# Patient Record
Sex: FEMALE | Race: WHITE | Employment: OTHER | URBAN - METROPOLITAN AREA
[De-identification: names, ages, dates, MRNs, and addresses within clinical notes are randomized per-mention and may not be internally consistent; named-entity substitution may affect disease eponyms.]

---

## 2022-04-06 ENCOUNTER — HOSPITAL ENCOUNTER (OUTPATIENT)
Age: 67
Discharge: HOME | End: 2022-04-06
Payer: MEDICARE

## 2022-04-06 ENCOUNTER — TELEPHONE (OUTPATIENT)
Dept: OPHTHALMOLOGY | Facility: CLINIC | Age: 67
End: 2022-04-06

## 2022-04-06 VITALS
DIASTOLIC BLOOD PRESSURE: 86 MMHG | RESPIRATION RATE: 18 BRPM | SYSTOLIC BLOOD PRESSURE: 145 MMHG | OXYGEN SATURATION: 98 % | HEART RATE: 88 BPM | TEMPERATURE: 99 F

## 2022-04-06 DIAGNOSIS — S05.02XA ABRASION OF LEFT CORNEA, INITIAL ENCOUNTER: Primary | ICD-10-CM

## 2022-04-06 PROCEDURE — 99203 OFFICE O/P NEW LOW 30 MIN: CPT | Performed by: NURSE PRACTITIONER

## 2022-04-06 RX ORDER — ERYTHROMYCIN 5 MG/G
1 OINTMENT OPHTHALMIC EVERY 6 HOURS
Qty: 1 G | Refills: 0 | Status: SHIPPED | OUTPATIENT
Start: 2022-04-06 | End: 2022-04-13

## 2022-04-06 NOTE — TELEPHONE ENCOUNTER
Pt's  called. Pt went to immediate care today 4-6-22 abrasion of left cornea. Referred to be seen within 24 to 48 hours.    Call pt

## 2022-04-06 NOTE — ED INITIAL ASSESSMENT (HPI)
Pt states having left eye pain that began yesterday after applying make up. Pt states woke up today with with redness discharge and blurry vision in left eye. Pt states painful and change in vision. Pt states just came back from [facility] 2 days ago.

## 2022-04-07 ENCOUNTER — OFFICE VISIT (OUTPATIENT)
Dept: OPHTHALMOLOGY | Facility: CLINIC | Age: 67
End: 2022-04-07
Payer: MEDICARE

## 2022-04-07 DIAGNOSIS — H18.892 CORNEAL DEFECT, LEFT: Primary | ICD-10-CM

## 2022-04-07 PROCEDURE — 99203 OFFICE O/P NEW LOW 30 MIN: CPT | Performed by: OPHTHALMOLOGY

## 2022-04-07 RX ORDER — PREDNISOLONE ACETATE 10 MG/ML
SUSPENSION/ DROPS OPHTHALMIC
Qty: 1 EACH | Refills: 0 | Status: SHIPPED | OUTPATIENT
Start: 2022-04-07

## 2022-04-07 NOTE — ASSESSMENT & PLAN NOTE
START Pred forte drops- use 1 drop 4 times a day in the left eye. Continue taking Pred forte 4 times a day until next visit next week. Use Erythromycin ointment at bedtime in the left eye. (okay to use during the day, but advise only to use at bedtime. Return next week, but advised patient to call on this Saturday morning for an appointment if symptoms are worsening.

## 2022-04-07 NOTE — PATIENT INSTRUCTIONS
Corneal defect, left  START Pred forte drops- use 1 drop 4 times a day in the left eye. Continue taking Pred forte 4 times a day until next visit next week. Use Erythromycin ointment at bedtime in the left eye. (okay to use during the day, but advise only to use at bedtime. Return next week, but advised patient to call on this Saturday morning for an appointment if symptoms are worsening.

## 2022-04-12 ENCOUNTER — TELEPHONE (OUTPATIENT)
Dept: OPHTHALMOLOGY | Facility: CLINIC | Age: 67
End: 2022-04-12

## 2022-04-12 NOTE — TELEPHONE ENCOUNTER
Spoke to patient regarding canceled appointment for today to recheck K defect left eye. Pt states she canceled because left eye felt much better and she saw something small come out of her eye which she thinks is what was causing irritation. Pt rescheduled to Thursday 4/14/22 @ 3:30pm. Advised patient it is best to come back to follow up to make sure eye is completely healed and due to being on a steroid eye drop for a few days. Pt understands.

## 2022-04-14 ENCOUNTER — OFFICE VISIT (OUTPATIENT)
Dept: OPHTHALMOLOGY | Facility: CLINIC | Age: 67
End: 2022-04-14
Payer: MEDICARE

## 2022-04-14 DIAGNOSIS — H18.892 CORNEAL DEFECT, LEFT: Primary | ICD-10-CM

## 2022-04-14 DIAGNOSIS — H53.10 EYE STRAIN, BILATERAL: ICD-10-CM

## 2022-04-14 PROCEDURE — 99213 OFFICE O/P EST LOW 20 MIN: CPT | Performed by: OPHTHALMOLOGY

## 2022-04-14 NOTE — ASSESSMENT & PLAN NOTE
Recommend that patient take an \"eye break\" when they are working at the computer. Patient should take a break every 20 minutes, looking away from the computer for 20 seconds at the distance of 20 feet to prevent eye fatigue.

## 2022-04-14 NOTE — ASSESSMENT & PLAN NOTE
Resolved. Stay off of Prednisolone drops. No further treatment. Patient can use artificial tears (any over the counter brand is okay) up to 4 times per day as needed for eye irritation. Patient should call office and make return appointment if symptoms worsen or do not completely resolve.

## 2022-04-14 NOTE — PATIENT INSTRUCTIONS
Corneal defect, left  Resolved. Stay off of Prednisolone drops. No further treatment. Patient can use artificial tears (any over the counter brand is okay) up to 4 times per day as needed for eye irritation. Patient should call office and make return appointment if symptoms worsen or do not completely resolve. Eye strain, bilateral  Recommend that patient take an \"eye break\" when they are working at the computer. Patient should take a break every 20 minutes, looking away from the computer for 20 seconds at the distance of 20 feet to prevent eye fatigue.

## 2022-10-22 ENCOUNTER — HOSPITAL ENCOUNTER (OUTPATIENT)
Age: 67
Discharge: HOME | End: 2022-10-22
Payer: COMMERCIAL

## 2022-10-22 VITALS
DIASTOLIC BLOOD PRESSURE: 85 MMHG | TEMPERATURE: 99 F | RESPIRATION RATE: 20 BRPM | HEART RATE: 103 BPM | SYSTOLIC BLOOD PRESSURE: 132 MMHG | OXYGEN SATURATION: 98 %

## 2022-10-22 DIAGNOSIS — N61.0 CELLULITIS OF RIGHT BREAST: Primary | ICD-10-CM

## 2022-10-22 DIAGNOSIS — N63.12 MASS OF UPPER INNER QUADRANT OF RIGHT BREAST: ICD-10-CM

## 2022-10-22 RX ORDER — CEPHALEXIN 500 MG/1
500 CAPSULE ORAL 3 TIMES DAILY
Qty: 21 CAPSULE | Refills: 0 | Status: SHIPPED | OUTPATIENT
Start: 2022-10-22 | End: 2022-10-29

## 2022-10-24 NOTE — ED QUICK NOTES
Pt called stating that there is more pain and a foul odor from abscess even after being on antibiotics for 3 days. Instructed pt to go to ED.

## 2022-10-31 ENCOUNTER — OFFICE VISIT (OUTPATIENT)
Dept: SURGERY | Facility: CLINIC | Age: 67
End: 2022-10-31
Payer: COMMERCIAL

## 2022-10-31 VITALS
DIASTOLIC BLOOD PRESSURE: 89 MMHG | HEIGHT: 67 IN | SYSTOLIC BLOOD PRESSURE: 142 MMHG | HEART RATE: 92 BPM | BODY MASS INDEX: 28.28 KG/M2 | WEIGHT: 180.19 LBS | OXYGEN SATURATION: 97 % | RESPIRATION RATE: 16 BRPM | TEMPERATURE: 98 F

## 2022-10-31 DIAGNOSIS — N60.01 BREAST CYST, RIGHT: Primary | ICD-10-CM

## 2022-10-31 PROCEDURE — 3077F SYST BP >= 140 MM HG: CPT | Performed by: SURGERY

## 2022-10-31 PROCEDURE — 87070 CULTURE OTHR SPECIMN AEROBIC: CPT | Performed by: SURGERY

## 2022-10-31 PROCEDURE — 10060 I&D ABSCESS SIMPLE/SINGLE: CPT | Performed by: SURGERY

## 2022-10-31 PROCEDURE — 3008F BODY MASS INDEX DOCD: CPT | Performed by: SURGERY

## 2022-10-31 PROCEDURE — 87205 SMEAR GRAM STAIN: CPT | Performed by: SURGERY

## 2022-10-31 PROCEDURE — 99244 OFF/OP CNSLTJ NEW/EST MOD 40: CPT | Performed by: SURGERY

## 2022-10-31 PROCEDURE — 3079F DIAST BP 80-89 MM HG: CPT | Performed by: SURGERY

## 2022-10-31 RX ORDER — AMOXICILLIN AND CLAVULANATE POTASSIUM 875; 125 MG/1; MG/1
1 TABLET, FILM COATED ORAL EVERY 12 HOURS
COMMUNITY
Start: 2022-10-25

## 2022-11-01 ENCOUNTER — TELEPHONE (OUTPATIENT)
Dept: SURGERY | Facility: CLINIC | Age: 67
End: 2022-11-01

## 2022-11-01 NOTE — TELEPHONE ENCOUNTER
Called patient to check how she is feeling after aspiration in office. Per patient she still has 1 more day left of the amoxicillin remaining. Patient overall feeling well, had some drainage yesterday and no drainage today. Informed patient that we will continue to follow up.

## 2022-11-01 NOTE — TELEPHONE ENCOUNTER
Called patient to see how she was doing and how much longer she has oral antibiotics. No answer, left voicemail to call back to office.

## 2024-05-08 ENCOUNTER — OFFICE VISIT (OUTPATIENT)
Facility: CLINIC | Age: 69
End: 2024-05-08
Payer: MEDICARE

## 2024-05-08 ENCOUNTER — TELEPHONE (OUTPATIENT)
Facility: CLINIC | Age: 69
End: 2024-05-08

## 2024-05-08 VITALS
SYSTOLIC BLOOD PRESSURE: 142 MMHG | HEART RATE: 99 BPM | BODY MASS INDEX: 29.24 KG/M2 | DIASTOLIC BLOOD PRESSURE: 89 MMHG | HEIGHT: 67 IN | WEIGHT: 186.31 LBS

## 2024-05-08 DIAGNOSIS — Z12.11 COLON CANCER SCREENING: Primary | ICD-10-CM

## 2024-05-08 DIAGNOSIS — R10.9 ABDOMINAL PAIN, UNSPECIFIED ABDOMINAL LOCATION: ICD-10-CM

## 2024-05-08 DIAGNOSIS — R10.9 ABDOMINAL PAIN, UNSPECIFIED ABDOMINAL LOCATION: Primary | ICD-10-CM

## 2024-05-08 PROCEDURE — 99204 OFFICE O/P NEW MOD 45 MIN: CPT | Performed by: INTERNAL MEDICINE

## 2024-05-08 NOTE — PROGRESS NOTES
Claudia Trotter is a 68 year old female.    HPI:     Chief Complaint   Patient presents with    Jaw Pain     Slight pain on rt flank side       The patient is a 68-year-old female with history of hemorrhoids and constipation who presents for evaluation of right flank pain and colon cancer screening.    Claudia reports she has developed intermittent episodes of right upper quadrant/right flank pain which she feels like radiates up to her right jaw.  She has noticed this after heavier meals.  She did test out her GI tract by eating a hot dog yesterday and developed symptoms of discomfort in the right upper quadrant/right flank with radiation into the right jaw.  She had concerns about her gallbladder as there is a family history of sludge.  She does have chronic digestive issues including constipation particularly with dehydration.  She does workout quite a bit with Cross[employer] style exercises.  If she does not drink enough water she does develop harder stools.  She also has noticed in the winter and or when it is cold around she has digestive problems as well.     Last colonoscopy was at [facility] 12 years ago, no prior EGD.     She does have 4 grandchildren has noted episodes of gastroenteritis like symptoms.  She has about 4 episodes in the last year or so 1 of these was related to COVID.  She does get intermittent episodes of vomiting and anorexia.    There is a family history of cancer, she had a sister with breast cancer.  She reports that BRCA gene was in the family but she herself has not been tested.    Patient is a former , she does sing as well for living.  She denies any vocal issues, no reflux, no dysphagia.  Weights been stable.    She is seeing a new physician tomorrow for primary care.      HISTORY:  Past Medical History:    Hemorrhoids      Past Surgical History:   Procedure Laterality Date    Colonoscopy      12 yrs ago at [facility]    Lasik Bilateral 2001    [facility]    Tonsillectomy       Tubal ligation        Family History   Problem Relation Age of Onset    Other (gleoblastoma) Father     Pancreatic Cancer Mother     Cancer Sister         breast cancer    Diabetes Brother     Glaucoma Neg     Macular degeneration Neg       Social History:   Social History     Socioeconomic History    Marital status:    Tobacco Use    Smoking status: Never     Passive exposure: Past    Smokeless tobacco: Never   Vaping Use    Vaping status: Never Used   Substance and Sexual Activity    Alcohol use: Yes     Comment: a quarter of a beer    Drug use: Never     Social Determinants of Health      Received from [facility]    Social Connections    Received from [facility]    Housing Stability        Medications (Active prior to today's visit):  Current Outpatient Medications   Medication Sig Dispense Refill    amoxicillin clavulanate 875-125 MG Oral Tab Take 1 tablet by mouth Q12H. FOR 10 DAYS (Patient not taking: Reported on 5/8/2024)         Allergies:  Allergies   Allergen Reactions    Morphine OTHER (SEE COMMENTS)     intolerance    Other OTHER (SEE COMMENTS)     Narcotics cause nightmares     Sulfa Antibiotics OTHER (SEE COMMENTS)    Corn-Containing Products [Corn-Related Products] OTHER (SEE COMMENTS)         ROS:   The patient denies any chest pain or shortness of breath,  No neurologic or dermatologic symptoms.     PHYSICAL EXAM:   Blood pressure 142/89, pulse 99, height 5' 7\" (1.702 m), weight 186 lb 4.8 oz (84.5 kg).    The patient appears their stated age and is in no acute distress  HEENT- anicteric sclera, neck no lymphadnopathy, OP- clear with no masses or lesions  Chest- Clear bilaterally, no wheezing,  Heart- regular rate, no murmur or gallop  Abdomen- Soft and nontender, nondistended  Ext- no clubbing or cyanosis  Skin- no rashes or lesions  Neuro- appropriate response, alert, no confusion  .  ASSESSMENT/PLAN:   Assessment     Claudia has been  experiencing intermittent episodes of right upper quadrant pain, her symptoms of this in the jaw sound more like TMJ as she does notice a clicking when moving her jaw.  She does have a family history of gallbladder.  Symptoms are typically triggered by fatty meals.    Discussed dietary changes, consideration for upper endoscopy for atypical reflux type symptoms, screening for celiac disease with blood test as well as plan to set up right upper quadrant ultrasound.  Patient will call to schedule this.    The patient is due for colon screening, discussed her options and patient is agreeable to colonoscopy.  Will set this up with the upper endoscopy under MAC sedation.  Risks and benefits were discussed and she is agreeable to proceeding.    Plan  Abdominal issues /pain  Colon cancer screening  - colonoscopy and EGD with MAC and Golytely   - lab work - please have your primary doctor draw a celiac blood test  - call to set up ultrasound     EGD/Colonoscopy consent: I have discussed the risks, benefits, and alternatives to EGD/colonoscopy with the patient [who demonstrated understanding], including but not limited to the risks of bleeding, infection, pain, death, as well as the risks of anesthesia and perforation all leading to prolonged hospitalization, surgical intervention, or even death. I also specifically mentioned the miss rate of colonoscopy of 5-10% in the best of all circumstances. All questions were answered to the patient's satisfaction. The patient signed informed consent and elected to proceed with colonoscopy with intervention [i.e. polypectomy, stent placement, etc.] as indicated.       Orders This Visit:  No orders of the defined types were placed in this encounter.      Meds This Visit:  Requested Prescriptions      No prescriptions requested or ordered in this encounter       Imaging & Referrals:  None       Theo Wagner MD  [facility]

## 2024-05-08 NOTE — PATIENT INSTRUCTIONS
Abdominal issues /pain  Colon cancer screening  - colonoscopy and EGD with MAC and Golytely   - lab work - please have your primary doctor draw a celiac blood test  - call to set up ultrasound

## 2024-05-08 NOTE — TELEPHONE ENCOUNTER
Scheduled for:  Colonoscopy 34139/EGD [id number]  Provider Name:   Wagner  Date:  08/26/2024  Location:  [facility]  Sedation:  MAC  Time:  1030 (pt is aware to arrive at 0930)  Prep:  Colyte  Meds/Allergies Reconciled?:  Physician reviewed  Diagnosis with codes:  CCS Z12.11; Abdominal Pain R10.9  Was patient informed to call insurance with codes (Y/N):       Referral sent?:  Referral was sent at the time of electronic surgical scheduling.    EM or [facility] notified?:  I sent an electronic request to Endo Scheduling and received a confirmation today.   Medication Orders:  Patient is aware to NOT take iron pills, herbal meds and diet supplements for 7 days before exam. Also to NOT take any form of alcohol, recreational drugs and any forms of ED meds 24-72 hours before exam.     Misc Orders:       Further instructions given by staff:  I discussed the prep intructions with the patient in office which SHE verbally understood. Copy of instructions was handed to patient as well. Patient was also advised about cancellation policy.

## 2024-06-11 ENCOUNTER — TELEPHONE (OUTPATIENT)
Facility: CLINIC | Age: 69
End: 2024-06-11

## 2024-06-11 NOTE — TELEPHONE ENCOUNTER
1st Reminder letter was sent to patient mychart for orders pending:     Immunoglobulin A, Qn, Serum (IGA) (Order #[id number]) on 5/8/24     Tissue Transglutaminase Ab, IgA (Order #[id number]) on 5/8/24     US ABDOMEN COMPLETE (CPT=76700) (Order #[id number]) on 5/8/24

## 2024-08-26 ENCOUNTER — ANESTHESIA (OUTPATIENT)
Dept: ENDOSCOPY | Facility: HOSPITAL | Age: 69
End: 2024-08-26
Payer: MEDICARE

## 2024-08-26 ENCOUNTER — TELEPHONE (OUTPATIENT)
Facility: CLINIC | Age: 69
End: 2024-08-26

## 2024-08-26 ENCOUNTER — HOSPITAL ENCOUNTER (OUTPATIENT)
Facility: HOSPITAL | Age: 69
Setting detail: HOSPITAL OUTPATIENT SURGERY
Discharge: HOME | End: 2024-08-26
Attending: INTERNAL MEDICINE | Admitting: INTERNAL MEDICINE
Payer: MEDICARE

## 2024-08-26 ENCOUNTER — ANESTHESIA EVENT (OUTPATIENT)
Dept: ENDOSCOPY | Facility: HOSPITAL | Age: 69
End: 2024-08-26
Payer: MEDICARE

## 2024-08-26 DIAGNOSIS — Z12.11 COLON CANCER SCREENING: ICD-10-CM

## 2024-08-26 DIAGNOSIS — R10.9 ABDOMINAL PAIN, UNSPECIFIED ABDOMINAL LOCATION: ICD-10-CM

## 2024-08-26 PROCEDURE — 45385 COLONOSCOPY W/LESION REMOVAL: CPT | Performed by: INTERNAL MEDICINE

## 2024-08-26 PROCEDURE — 45380 COLONOSCOPY AND BIOPSY: CPT | Performed by: INTERNAL MEDICINE

## 2024-08-26 PROCEDURE — 0DB98ZX EXCISION OF DUODENUM, VIA NATURAL OR ARTIFICIAL OPENING ENDOSCOPIC, DIAGNOSTIC: ICD-10-PCS | Performed by: INTERNAL MEDICINE

## 2024-08-26 PROCEDURE — 0DBH8ZX EXCISION OF CECUM, VIA NATURAL OR ARTIFICIAL OPENING ENDOSCOPIC, DIAGNOSTIC: ICD-10-PCS | Performed by: INTERNAL MEDICINE

## 2024-08-26 PROCEDURE — 0DBL8ZX EXCISION OF TRANSVERSE COLON, VIA NATURAL OR ARTIFICIAL OPENING ENDOSCOPIC, DIAGNOSTIC: ICD-10-PCS | Performed by: INTERNAL MEDICINE

## 2024-08-26 PROCEDURE — 43239 EGD BIOPSY SINGLE/MULTIPLE: CPT | Performed by: INTERNAL MEDICINE

## 2024-08-26 PROCEDURE — 0DBM8ZX EXCISION OF DESCENDING COLON, VIA NATURAL OR ARTIFICIAL OPENING ENDOSCOPIC, DIAGNOSTIC: ICD-10-PCS | Performed by: INTERNAL MEDICINE

## 2024-08-26 PROCEDURE — 0DBK8ZX EXCISION OF ASCENDING COLON, VIA NATURAL OR ARTIFICIAL OPENING ENDOSCOPIC, DIAGNOSTIC: ICD-10-PCS | Performed by: INTERNAL MEDICINE

## 2024-08-26 PROCEDURE — 0DB78ZX EXCISION OF STOMACH, PYLORUS, VIA NATURAL OR ARTIFICIAL OPENING ENDOSCOPIC, DIAGNOSTIC: ICD-10-PCS | Performed by: INTERNAL MEDICINE

## 2024-08-26 RX ORDER — LIDOCAINE HYDROCHLORIDE 10 MG/ML
INJECTION, SOLUTION EPIDURAL; INFILTRATION; INTRACAUDAL; PERINEURAL AS NEEDED
Status: DISCONTINUED | OUTPATIENT
Start: 2024-08-26 | End: 2024-08-26 | Stop reason: SURG

## 2024-08-26 RX ORDER — SODIUM CHLORIDE, SODIUM LACTATE, POTASSIUM CHLORIDE, CALCIUM CHLORIDE 600; 310; 30; 20 MG/100ML; MG/100ML; MG/100ML; MG/100ML
INJECTION, SOLUTION INTRAVENOUS CONTINUOUS
Status: DISCONTINUED | OUTPATIENT
Start: 2024-08-26 | End: 2024-08-26

## 2024-08-26 RX ORDER — NALOXONE HYDROCHLORIDE 0.4 MG/ML
0.08 INJECTION, SOLUTION INTRAMUSCULAR; INTRAVENOUS; SUBCUTANEOUS ONCE AS NEEDED
Status: DISCONTINUED | OUTPATIENT
Start: 2024-08-26 | End: 2024-08-26

## 2024-08-26 RX ADMIN — SODIUM CHLORIDE, SODIUM LACTATE, POTASSIUM CHLORIDE, CALCIUM CHLORIDE: 600; 310; 30; 20 INJECTION, SOLUTION INTRAVENOUS at 11:31:00

## 2024-08-26 RX ADMIN — LIDOCAINE HYDROCHLORIDE 25 MG: 10 INJECTION, SOLUTION EPIDURAL; INFILTRATION; INTRACAUDAL; PERINEURAL at 10:51:00

## 2024-08-26 RX ADMIN — SODIUM CHLORIDE, SODIUM LACTATE, POTASSIUM CHLORIDE, CALCIUM CHLORIDE: 600; 310; 30; 20 INJECTION, SOLUTION INTRAVENOUS at 10:47:00

## 2024-08-26 NOTE — TELEPHONE ENCOUNTER
Patient contacted, results of her procedures including colonoscopy EGD were reviewed.  She has a ultrasound scheduled.  She does not want to go on the omeprazole.  She like to try the dietary adjustments, discussed possible CT scan if ongoing symptoms.  She does have a family history of pancreatic cancer although she said her mom was in her later 80s when she was diagnosed.  She does not want to schedule or consider CT at this time.    To call me after she gets the ultrasound done, she reports she has a schedule.

## 2024-08-26 NOTE — ANESTHESIA POSTPROCEDURE EVALUATION
Patient: Claudia Trotter    Procedure Summary       Date: 08/26/24 Room / Location: [facility] ENDOSCOPY 02 / EM ENDOSCOPY    Anesthesia Start: 1047 Anesthesia Stop: 1132    Procedures:       COLONOSCOPY      ESOPHAGOGASTRODUODENOSCOPY (EGD) Diagnosis:       Colon cancer screening      Abdominal pain, unspecified abdominal location      (Colon Polyps, Diverticulosis, Hemorrhoids; Hiatal Hernia, Gastritis, Gastric Polyp)    Surgeons: Theo Wagner MD Anesthesiologist: Courtney Ramos CRNA    Anesthesia Type: MAC, general ASA Status: 1            Anesthesia Type: MAC, general    Vitals Value Taken Time   /80 08/26/24 1131   Temp 98 °F (36.7 °C) 08/26/24 1131   Pulse 73 08/26/24 1131   Resp 20 08/26/24 1131   SpO2 96 % 08/26/24 1131       [facility] AN Post Evaluation:   Patient Evaluated in PACU  Patient Participation: complete - patient participated  Level of Consciousness: awake  Pain Management: adequate  Airway Patency:patent  Dental exam unchanged from preop  Yes    Cardiovascular Status: acceptable  Respiratory Status: acceptable  Postoperative Hydration acceptable      Courtney Ramos CRNA  8/26/2024 11:32 AM

## 2024-08-26 NOTE — DISCHARGE INSTRUCTIONS
Home Care Instructions for Colonoscopy and/or Gastroscopy with Sedation    Diet:  - Resume your regular diet as tolerated unless otherwise instructed.  - Start with light meals to minimize bloating.  - Do not drink alcohol today.    Medication:  - If you have questions about resuming your normal medications, please contact your Primary Care Physician.    Activities:  - Take it easy today. Do not return to work today.  - Do not drive today.  - Do not operate any machinery today (including kitchen equipment).    Colonoscopy:  - You may notice some rectal \"spotting\" (a little blood on the toilet tissue) for a day or two after the exam. This is normal.  - If you experience any rectal bleeding (not spotting), persistent tenderness or sharp severe abdominal pains, oral temperature over 100 degrees Fahrenheit, light-headedness or dizziness, or any other problems, contact your doctor.    Gastroscopy:  - You may have a sore throat for 2-3 days following the exam. This is normal. Gargling with warm salt water (1/2 tsp salt to 1 glass warm water) or using throat lozenges will help.  - If you experience any sharp pain in your neck, abdomen or chest, vomiting of blood, oral temperature over 100 degrees Fahrenheit, light-headedness or dizziness, or any other problems, contact your doctor.    **If unable to reach your doctor, please go to the [facility]**    - Your referring physician will receive a full report of your examination.  - If you do not hear from your doctor's office within two weeks of your biopsy, please call them for your results.    You may be able to see your laboratory results in [employer] between 4 and 7 business days.  In some cases, your physician may not have viewed the results before they are released to [employer].  If you have questions regarding your results contact the physician who ordered the test/exam by phone or via [employer] by choosing \"Ask a Medical Question.\"   Pt has been informed.

## 2024-08-26 NOTE — ANESTHESIA PREPROCEDURE EVALUATION
Anesthesia PreOp Note    HPI:     Claudia Trotter is a 69 year old female who presents for preoperative consultation requested by: Theo Wagner MD    Date of Surgery: 8/26/2024    Procedure(s):  COLONOSCOPY  ESOPHAGOGASTRODUODENOSCOPY (EGD)  Indication: Colon cancer screening /Abdominal pain, unspecified abdominal location    Relevant Problems   No relevant active problems       NPO:  Last Liquid Consumption Date: 08/26/24  Last Liquid Consumption Time: 0500  Last Solid Consumption Date: 08/25/24  Last Solid Consumption Time: 0800  Last Liquid Consumption Date: 08/26/24          History Review:  Patient Active Problem List    Diagnosis Date Noted    Eye strain, bilateral 04/14/2022    Corneal defect, left 04/07/2022       Past Medical History:    Hemorrhoids    High cholesterol       Past Surgical History:   Procedure Laterality Date    Colonoscopy      12 yrs ago at [facility]    Tonsillectomy      Tubal ligation         Medications Prior to Admission   Medication Sig Dispense Refill Last Dose    amoxicillin clavulanate 875-125 MG Oral Tab Take 1 tablet by mouth Q12H. FOR 10 DAYS (Patient not taking: Reported on 5/8/2024)        Current Facility-Administered Medications Ordered in Epic   Medication Dose Route Frequency Provider Last Rate Last Admin    lactated ringers infusion   Intravenous Continuous Theo Wagner MD         No current [facility]-ordered outpatient medications on file.       Allergies   Allergen Reactions    Morphine OTHER (SEE COMMENTS)     intolerance    Other OTHER (SEE COMMENTS)     Narcotics cause nightmares     Sulfa Antibiotics OTHER (SEE COMMENTS)    Corn-Containing Products [Corn-Related Products] OTHER (SEE COMMENTS)       Family History   Problem Relation Age of Onset    Other (gleoblastoma) Father     Pancreatic Cancer Mother     Cancer Sister         breast cancer    Diabetes Brother     Glaucoma Neg     Macular degeneration Neg       Social History     Socioeconomic History    Marital status:    Tobacco Use    Smoking status: Never     Passive exposure: Past    Smokeless tobacco: Never   Vaping Use    Vaping status: Never Used   Substance and Sexual Activity    Alcohol use: Yes     Comment: a quarter of a beer    Drug use: Never       Available pre-op labs reviewed.             Vital Signs:  Body mass index is 29.13 kg/m².   height is 1.702 m (5' 7\") and weight is 84.4 kg (186 lb). Her blood pressure is 131/83 and her pulse is 77. Her respiration is 18 and oxygen saturation is 97%.   Vitals:    08/20/24 1404 08/26/24 1037   BP:  131/83   Pulse:  77   Resp:  18   SpO2:  97%   Weight: 84.4 kg (186 lb)    Height: 1.702 m (5' 7\")         Anesthesia Evaluation     Patient summary reviewed and Nursing notes reviewed    No history of anesthetic complications   Airway   Mallampati: I  TM distance: >3 FB  Neck ROM: full  Dental - Dentition appears grossly intact     Pulmonary - negative ROS and normal exam   Cardiovascular - negative ROS and normal exam  Exercise tolerance: good    Neuro/Psych - negative ROS     GI/Hepatic/Renal    (+) bowel prep    Endo/Other - negative ROS   Abdominal  - normal exam                 Anesthesia Plan:   ASA:  1  Plan:   MAC and general  Informed Consent Plan and Risks Discussed With:  Patient  Discussed plan with:  CRNA and surgeon      I have informed Claudia MARGRET Trotter and/or legal guardian or family member of the nature of the anesthetic plan, benefits, risks including possible dental damage if relevant, major complications, and any alternative forms of anesthetic management.   All of the patient's questions were answered to the best of my ability. The patient desires the anesthetic management as planned.  Courtney Ramos CRNA  8/26/2024 10:40 AM  Present on Admission:  **None**

## 2024-08-26 NOTE — OPERATIVE REPORT
[facility] Endoscopy Report  Date of procedure-August 26, 2024    Preoperative Diagnosis:  -Colorectal cancer screening  -Abdominal pain      Postoperative Diagnosis:  -Colon polyps x 8  -Diverticulosis  -Internal hemorrhoids  -Hiatal hernia 2 cm  -Gastritis  -Gastric polyps      Procedure:    Colonoscopy   Esophagogastroduodenoscopy       Surgeon:  Theo Wagner M.D.    Anesthesia:  MAC     Technique:  After informed consent, the patient was placed in the left lateral recumbent position.  Digital rectal examination revealed no palpable intraluminal abnormalities.  An Olympus variable stiffness 190 series HD colonoscope was inserted into the rectum and advanced under direct vision by following the lumen to the cecum.  The colon was examined upon withdrawal in the left lateral position.    Following colonoscopy, an Olympus adult HD gastroscope was inserted into the hypopharynx and advanced under direct vision into the esophagus, stomach and duodenum.  The endoscope was withdrawn to the stomach where retroflexion of the annulus, body, cardia and fundus was performed.  The instrument was straightened, insufflated air and fluid were suctioned and the endoscope was withdrawn.  The procedures were well tolerated without immediate complication.      Findings:  The preparation of the colon was good.  The terminal ileum was examined for 4 cm and visually normal.  The ileocecal valve was well preserved. The visualized colonic mucosa from the cecum to the anal verge was normal with an intact vascular pattern.    Colon polyps x 8 removed as follows;  -Cecum x 3, each polyp was sessile approximately 3 to 4 mm in size and cold snare removed.  -Ascending x 2, both polyps were sessile and the larger polyp in the segment was 8 mm and cold snare removed and the smaller polyp was 4 mm and cold snare removed.  -Transverse x 1, 4 mm in size sessile and cold snare removed.  -Descending x 2, both polyps were diminutive  and removed by cold forceps technique.  All polypectomy sites inspected controlled reviewed bleeding and specimens retrieved and sent for analysis.    Diverticulosis located the left side of the colon, no diverticulitis.    Small internal hemorrhoids noted on retroflexed view.    The esophagus was normal.  The GE junction and diaphragmatic impression were at 38 and 40 cm for a 2 cm hiatal hernia.  The stomach distended appropriately with insufflated air.  The mucosa of the stomach showed gastric erythema in the antrum of the stomach, biopsies taken.  Additionally in the body of the stomach small fundic gland like polyps were noted each 1 was about 2 mm or so in size, biopsied sampled.  The duodenal bulb and post bulbar regions were normal.    Estimated blood loss-insignificant  Specimens-see above    Impression:  -Colon polyps x 8  -Diverticulosis  -Internal hemorrhoids  -Hiatal hernia 2 cm  -Gastritis  -Gastric polyps    Recommendations:  - Post polypectomy instructions given  - Repeat colonoscopy in 3- 5 years  - High fiber diet for diverticular disease  - Symptomatic treatment of hemorrhoids  - Follow up on upper GI biopsies  - Abdominal imaging advised ( ordered in May )           Theo Wagner MD  8/26/2024  11:34 AM

## 2024-08-26 NOTE — H&P
History & Physical Examination    Patient Name: Claudia Trotter  MRN: [id number]  CSN: [id number]  YOB: 1955    Diagnosis:   Abdominal pain  CRC screening      Medications Prior to Admission   Medication Sig Dispense Refill Last Dose    amoxicillin clavulanate 875-125 MG Oral Tab Take 1 tablet by mouth Q12H. FOR 10 DAYS (Patient not taking: Reported on 5/8/2024)        Current Facility-Administered Medications   Medication Dose Route Frequency    lactated ringers infusion   Intravenous Continuous       Allergies:   Allergies   Allergen Reactions    Morphine OTHER (SEE COMMENTS)     intolerance    Other OTHER (SEE COMMENTS)     Narcotics cause nightmares     Sulfa Antibiotics OTHER (SEE COMMENTS)    Corn-Containing Products [Corn-Related Products] OTHER (SEE COMMENTS)       Past Medical History:    Hemorrhoids    High cholesterol     Past Surgical History:   Procedure Laterality Date    Colonoscopy      12 yrs ago at [facility]    Las Bilateral 2001    [facility]    Tonsillectomy      Tubal ligation       Family History   Problem Relation Age of Onset    Other (gleoblastoma) Father     Pancreatic Cancer Mother     Cancer Sister         breast cancer    Diabetes Brother     Glaucoma Neg     Macular degeneration Neg      Social History     Tobacco Use    Smoking status: Never     Passive exposure: Past    Smokeless tobacco: Never   Substance Use Topics    Alcohol use: Yes     Comment: a quarter of a [employer]       SYSTEM Check if Review is Normal Check if Physical Exam is Normal If not normal, please explain:   HEENT [x ] [ x]    NECK & BACK [x ] [x ]    HEART [x ] [ x]    LUNGS [x ] [ x]    ABDOMEN [x ] [x ]    UROGENITAL [ ] [ ]    EXTREMITIES [x ] [x ]    OTHER        [ x ] I have discussed the risks and benefits and alternatives with the patient/family.  They understand and agree to proceed with plan of care.  [ x ] I have reviewed the History and Physical done within the last 30 days.  Any  changes noted above.    Theo Wagner MD  8/26/2024  10:31 AM

## 2024-08-27 ENCOUNTER — TELEPHONE (OUTPATIENT)
Facility: CLINIC | Age: 69
End: 2024-08-27

## 2024-08-27 VITALS
HEART RATE: 65 BPM | BODY MASS INDEX: 29.19 KG/M2 | TEMPERATURE: 98 F | OXYGEN SATURATION: 95 % | SYSTOLIC BLOOD PRESSURE: 118 MMHG | DIASTOLIC BLOOD PRESSURE: 88 MMHG | HEIGHT: 67 IN | RESPIRATION RATE: 13 BRPM | WEIGHT: 186 LBS

## 2024-08-27 NOTE — TELEPHONE ENCOUNTER
Patient contacted, verified and states Dr. Wagner called her last night to review test results.  No further questions at this time.      Health maintenance updated.  3 year colonoscopy recall placed in patient outreach.Next due on 2027 per Dr. Wagner.

## 2024-08-27 NOTE — TELEPHONE ENCOUNTER
----- Message from Theo Wagner sent at 8/26/2024  4:56 PM CDT -----  I wanted to get back to you with your colonoscopy and EGD results.      You had 8 colon polyps removed which were benign.  I would advise a repeat colonoscopy in 3 years to make sure no new polyps are forming.  You also have internal hemorrhoids and diverticulosis.      The upper endoscopy showed stomach polyps ( benign ) and irritation of the stomach, gastritis. Samples taken were negative for infection ( no H pylori).  I would advise omeprazole 20mg daily x 1 month.     Please get the ultrasound done.

## 2024-09-05 ENCOUNTER — HOSPITAL ENCOUNTER (OUTPATIENT)
Dept: ULTRASOUND IMAGING | Age: 69
Discharge: HOME | End: 2024-09-05
Attending: INTERNAL MEDICINE
Payer: MEDICARE

## 2024-09-05 DIAGNOSIS — R10.9 ABDOMINAL PAIN, UNSPECIFIED ABDOMINAL LOCATION: ICD-10-CM

## 2024-09-05 PROCEDURE — 76700 US EXAM ABDOM COMPLETE: CPT | Performed by: INTERNAL MEDICINE

## 2024-09-06 ENCOUNTER — TELEPHONE (OUTPATIENT)
Facility: CLINIC | Age: 69
End: 2024-09-06

## 2024-09-06 NOTE — TELEPHONE ENCOUNTER
I called the patient.  She couldn't talk because she is driving.  She told me that she would look at the result in her MyChart.

## 2024-09-06 NOTE — TELEPHONE ENCOUNTER
----- Message from Theo Wagner sent at 9/5/2024  5:02 PM CDT -----  Ultrasound showed fatty liver and a small liver cyst ( benign).     No cause for abdominal symptoms.  Call office if you would like to undergo further workup /CT scan.  For the fatty liver please work on healthy lifestyle, diet and exercise.

## 2024-09-09 NOTE — TELEPHONE ENCOUNTER
Patient viewed below result note in MyChart:  Seen by patient Claudia Trotter on 9/6/2024 12:53 PM

## 2025-05-16 ENCOUNTER — HOSPITAL ENCOUNTER (OUTPATIENT)
Age: 70
Discharge: HOME | End: 2025-05-16
Payer: MEDICARE

## 2025-05-16 VITALS
HEART RATE: 93 BPM | DIASTOLIC BLOOD PRESSURE: 91 MMHG | SYSTOLIC BLOOD PRESSURE: 143 MMHG | TEMPERATURE: 99 F | OXYGEN SATURATION: 97 % | RESPIRATION RATE: 20 BRPM

## 2025-05-16 DIAGNOSIS — L20.82 FLEXURAL ECZEMA: Primary | ICD-10-CM

## 2025-05-16 DIAGNOSIS — L30.4 INTERTRIGO: ICD-10-CM

## 2025-05-16 DIAGNOSIS — B37.2 SKIN CANDIDIASIS: ICD-10-CM

## 2025-05-16 RX ORDER — NYSTATIN 100000 [USP'U]/G
1 POWDER TOPICAL 3 TIMES DAILY
Qty: 60 G | Refills: 0 | Status: SHIPPED | OUTPATIENT
Start: 2025-05-16 | End: 2025-05-23

## 2025-05-16 RX ORDER — TRIAMCINOLONE ACETONIDE 0.25 MG/G
1 OINTMENT TOPICAL 2 TIMES DAILY
Qty: 15 G | Refills: 0 | Status: SHIPPED | OUTPATIENT
Start: 2025-05-16 | End: 2025-05-23

## 2025-05-16 RX ORDER — FLUCONAZOLE 150 MG/1
150 TABLET ORAL ONCE
Qty: 1 TABLET | Refills: 0 | Status: SHIPPED | OUTPATIENT
Start: 2025-05-16 | End: 2025-05-16

## 2025-05-16 NOTE — ED PROVIDER NOTES
Patient Seen in: [facility]      History     Chief Complaint   Patient presents with    Rash Skin Problem     Stated Complaint: skin rash    Subjective:   Well-appearing 69-year-old female presents with complaints of a rash to bilateral elbows for several months.  Patient communicates that at times rash flares up.  Patient denies a history of eczema but communicates a familial history.  Patient communicates that she has tried over-the-counter creams with no relief.  Patient denies pain, communicates that at times rash is pruritic.    Patient communicates that for the past couple of days she has had a rash and itching below her breast area.  Patient communicates that she started doing [employer] and has had increased perspiration to this area.  Patient denies pain but communicates that rash is pruritic and uncomfortable.  No over-the-counter medications have been taken for symptoms.      Objective:     Past Medical History:    Hemorrhoids    High cholesterol              Past Surgical History:   Procedure Laterality Date    Colonoscopy      12 yrs ago at [facility]    Colonoscopy  08/26/2024    Colon Polyps, Diverticulosis, Hemorrhoids    Colonoscopy N/A 8/26/2024    Procedure: COLONOSCOPY;  Surgeon: Theo Wagner MD;  Location: [facility] ENDOSCOPY    Egd  08/26/2024    Hiatal Hernia, Gastritis, Gastric Polyp    Lasik Bilateral 2001    [facility]    Tonsillectomy      Tubal ligation                  Social History     Socioeconomic History    Marital status:    Tobacco Use    Smoking status: Never     Passive exposure: Past    Smokeless tobacco: Never   Vaping Use    Vaping status: Never Used   Substance and Sexual Activity    Alcohol use: Yes     Comment: a quarter of a beer    Drug use: Never     Social Drivers of Health     Food Insecurity: No Food Insecurity (5/8/2024)    Received from [facility]    Food Insecurity     Currently or in the past 3 months, have you  worried your food would run out before you had money to buy more?: No     In the past 12 months, have you run out of food or been unable to get more?: No   Transportation Needs: No Transportation Needs (5/8/2024)    Received from [facility]    Transportation Needs     Currently or in the past 3 months, has lack of transportation kept you from medical appointments, getting food or medicine, or providing care to a family member?: No    Received from [facility]    Housing Stability              Review of Systems    Positive for stated complaint: skin rash  Other systems are as noted in HPI.  Constitutional and vital signs reviewed.      All other systems reviewed and negative except as noted above.      Physical Exam     ED Triage Vitals [05/16/25 1547]   BP (!) 143/91   Pulse 93   Resp 20   Temp 98.6 °F (37 °C)   Temp src    SpO2 97 %   O2 Device None (Room air)       Current Vitals:   Vital Signs  BP: (!) 143/91  Pulse: 93  Resp: 20  Temp: 98.6 °F (37 °C)    Oxygen Therapy  SpO2: 97 %  O2 Device: None (Room air)        Physical Exam  VS: Vital signs reviewed. 02 saturation within normal limits for this patient.    General: Patient is awake and alert, oriented to person, place and time. Pt appears non-toxic.     HEENT: Head is normocephalic, atraumatic. Nonicteric sclera, no conjunctival injection. No facial droop or slurred speech. No oral lesions or pallor. Mucous membranes moist.     Neck: Supple. Normal ROM.    Lungs: Good inspiratory effort. No accessory muscle use or tachypnea.    Extremities: No focal swelling or tenderness. Capillary refill noted.     Skin: Warm, dry and normal in color.   Elbows bilaterally: Erythematous, scaly lesions with surrounding erythema present to elbows bilaterally.  Rash extends distally.  No drainage.  No warmth.    Erythematous, macerated skin present below breast area.  Tender to palpation.  No drainage.    Psychiatric: Normal affect,  judgement normal, insight normal.     CNS: Moves all 4 extremities. Interacts appropriately. No gait abnormality. Memory normal.    ED Course   Labs Reviewed - No data to display    MDM   Medical Decision Making  Well-appearing.  Prescription for Diflucan and nystatin powder were sent to pharmacy on file for skin candidiasis.  I discussed keeping area clean and dry.  Prescription for triamcinolone topical ointment was sent to pharmacy on file for atopic dermatitis.  I discussed with patient that she may need a stronger formulation and she should follow-up with dermatology.  Differential diagnosis considered and discussed included cellulitis versus atopic dermatitis versus contact dermatitis versus candidiasis versus shingles  PMD follow-up.  Return precautions discussed    Problems Addressed:  Flexural eczema: acute illness or injury  Intertrigo: acute illness or injury  Skin candidiasis: acute illness or injury    Risk  OTC drugs.  Prescription drug management.          Disposition and Plan     Clinical Impression:  1. Flexural eczema    2. Skin candidiasis    3. Intertrigo         Disposition:  Discharge  5/16/2025  4:17 pm    Follow-up:  Lacie Salas MD  [address] [facility]  [address] [place] [address]  [phone]    In 1 week  As needed          Medications Prescribed:  Discharge Medication List as of 5/16/2025  4:18 PM        START taking these medications    Details   fluconazole (DIFLUCAN) 150 MG Oral Tab Take 1 tablet (150 mg total) by mouth once for 1 dose., Normal, Disp-1 tablet, R-0      triamcinolone 0.025 % External Ointment Apply 1 Application topically 2 (two) times daily for 7 days., Normal, Disp-15 g, R-0      nystatin 926030 UNIT/GM External Powder Apply 1 Application topically 3 (three) times daily for 7 days., Normal, Disp-60 g, R-0                   Supplementary Documentation:

## (undated) DEVICE — LASSO POLYPECTOMY SNARE: Brand: LASSO

## (undated) DEVICE — MEDI-VAC NON-CONDUCTIVE SUCTION TUBING: Brand: CARDINAL HEALTH

## (undated) DEVICE — KIT ENDO ORCAPOD 160/180/190

## (undated) DEVICE — GIJAW SINGLE-USE BIOPSY FORCEPS WITH NEEDLE: Brand: GIJAW

## (undated) DEVICE — CONMED SCOPE SAVER BITE BLOCK, 20X27 MM: Brand: SCOPE SAVER

## (undated) DEVICE — MEDI-VAC NON-CONDUCTIVE SUCTION TUBING 6MM X 1.8M (6FT.) L: Brand: CARDINAL HEALTH

## (undated) DEVICE — KIT MFLD FOR SPEC COLL

## (undated) DEVICE — CO2 CANNULA,SSOFT,ADLT,7O2,4CO2,FEMALE: Brand: MEDLINE

## (undated) DEVICE — V2 SPECIMEN COLLECTION TRAY: Brand: NEPTUNE

## (undated) DEVICE — KIT CLEAN ENDOKIT 1.1OZ GOWNX2

## (undated) DEVICE — SYRINGE, LUER SLIP, STERILE, 60ML: Brand: MEDLINE

## (undated) DEVICE — YANKAUER,BULB TIP,W/O VENT,RIGID,STERILE: Brand: MEDLINE

## (undated) NOTE — LETTER
[facility]  [address], [place], [place]    Authorization for Surgical Operation and Procedure                               I hereby authorize Theo Wagner MD, my physician and his/her assistants (if applicable), which may include medical students, residents, and/or fellows, to perform the following surgical operation/ procedure and administer such anesthesia as may be determined necessary by my physician: Operation/Procedure name (s) COLONOSCOPY / ESOPHAGOGASTRODUODENOSCOPY on Claudia Trotter   2.   I recognize that during the surgical operation/procedure, unforeseen conditions may necessitate additional or different procedures than those listed above.  I, therefore, further authorize and request that the above-named surgeon, assistants, or designees perform such procedures as are, in their judgment, necessary and desirable.    3.   My surgeon/physician has discussed prior to my surgery the potential benefits, risks and side effects of this procedure; the likelihood of achieving goals; and potential problems that might occur during recuperation.  They also discussed reasonable alternatives to the procedure, including risks, benefits, and side effects related to the alternatives and risks related to not receiving this procedure.  I have had all my questions answered and I acknowledge that no guarantee has been made as to the result that may be obtained.    4.   Should the need arise during my operation/procedure, which includes change of level of care prior to discharge, I also consent to the administration of blood and/or blood products.  Further, I understand that despite careful testing and screening of blood or blood products by collecting agencies, I may still be subject to ill effects as a result of receiving a blood transfusion and/or blood products.  The following are some, but not all, of the potential risks that can occur: fever and allergic reactions, hemolytic reactions,  transmission of diseases such as Hepatitis, AIDS and Cytomegalovirus (CMV) and fluid overload.  In the event that I wish to have an autologous transfusion of my own blood, or a directed donor transfusion, I will discuss this with my physician.  Check only if Refusing Blood or Blood Products  I understand refusal of blood or blood products as deemed necessary by my physician may have serious consequences to my condition to include possible death. I hereby assume responsibility for my refusal and release the hospital, its personnel, and my physicians from any responsibility for the consequences of my refusal.    o  Refuse   5.   I authorize the use of any specimen, organs, tissues, body parts or foreign objects that may be removed from my body during the operation/procedure for diagnosis, research or teaching purposes and their subsequent disposal by hospital authorities.  I also authorize the release of specimen test results and/or written reports to my treating physician on the hospital medical staff or other referring or consulting physicians involved in my care, at the discretion of the Pathologist or my treating physician.    6.   I consent to the photographing or videotaping of the operations or procedures to be performed, including appropriate portions of my body for medical, scientific, or educational purposes, provided my identity is not revealed by the pictures or by descriptive texts accompanying them.  If the procedure has been photographed/videotaped, the surgeon will obtain the original picture, image, videotape or CD.  The hospital will not be responsible for storage, release or maintenance of the picture, image, tape or CD.    7.   I consent to the presence of a  or observers in the operating room as deemed necessary by my physician or their designees.    8.   I recognize that in the event my procedure results in extended X-Ray/fluoroscopy time, I may develop a skin reaction.    9. If  I have a Do Not Attempt Resuscitation (DNAR) order in place, that status will be suspended while in the operating room, procedural suite, and during the recovery period unless otherwise explicitly stated by me (or a person authorized to consent on my behalf). The surgeon or my attending physician will determine when the applicable recovery period ends for purposes of reinstating the DNAR order.  10. Patients having a sterilization procedure: I understand that if the procedure is successful the results will be permanent and it will therefore be impossible for me to inseminate, conceive, or bear children.  I also understand that the procedure is intended to result in sterility, although the result has not been guaranteed.   11. I acknowledge that my physician has explained sedation/analgesia administration to me including the risk and benefits I consent to the administration of sedation/analgesia as may be necessary or desirable in the judgment of my physician.    I CERTIFY THAT I HAVE READ AND FULLY UNDERSTAND THE ABOVE CONSENT TO OPERATION and/or OTHER PROCEDURE.     ____________________________________  _________________________________        ______________________________  Signature of Patient    Signature of Responsible Person                Printed Name of Responsible Person                                      ____________________________________  _____________________________                ________________________________  Signature of Witness        Date  Time         Relationship to Patient    STATEMENT OF PHYSICIAN My signature below affirms that prior to the time of the procedure; I have explained to the patient and/or his/her legal representative, the risks and benefits involved in the proposed treatment and any reasonable alternative to the proposed treatment. I have also explained the risks and benefits involved in refusal of the proposed treatment and alternatives to the proposed treatment and have  answered the patient's questions. If I have a significant financial interest in a co-management agreement or a significant financial interest in any product or implant, or other significant relationship used in this procedure/surgery, I have disclosed this and had a discussion with my patient.     _____________________________________________________              _____________________________  (Signature of Physician)                                                                                         (Date)                                   (Time)  Patient Name: Claudia M Rose Marie      : 1955      Printed: 2024     Medical Record #: [id number]                                      Page 1 of 1

## (undated) NOTE — LETTER
[facility]  Administration of Anesthesia  I Claudia Trotter agree to be cared for by a physician anesthesiologist alone and/or with a nurse anesthetist, who is specially trained to monitor me and give me medicine to put me to sleep or keep me comfortable during my procedure    I understand that my anesthesiologist and/or anesthetist is not an employee or agent of [facility] or [facility]. He or she works for [facility]    As the patient asking for anesthesia services, I agree to:  Allow the anesthesiologist (anesthesia doctor) to give me medicine and do additional procedures as necessary. Some examples are: Starting or using an “IV” to give me medicine, fluids or blood during my procedure, and having a breathing tube placed to help me breathe when I’m asleep (intubation). In the event that my heart stops working properly, I understand that my anesthesiologist will make every effort to sustain my life, unless otherwise directed by [facility] Do Not Resuscitate documents.  Tell my anesthesia doctor before my procedure:  If I am pregnant.  The last time that I ate or drank.  iii. All of the medicines I take (including prescriptions, herbal supplements, and pills I can buy without a prescription (including street drugs/illegal medications). Failure to inform my anesthesiologist about these medicines may increase my risk of anesthetic complications.  iv.If I am allergic to anything or have had a reaction to anesthesia before.  I understand how the anesthesia medicine will help me (benefits).  I understand that with any type of anesthesia medicine there are risks:  The most common risks are: nausea, vomiting, sore throat, muscle soreness, damage to my eyes, mouth, or teeth (from breathing tube placement).  Rare risks include: remembering what happened during my procedure, allergic reactions to medications, injury to my airway, heart, lungs, vision, nerves, or  muscles and in extremely rare instances death.  My doctor has explained to me other choices available to me for my care (alternatives).  Pregnant Patients (“epidural”):  I understand that the risks of having an epidural (medicine given into my back to help control pain during labor), include itching, low blood pressure, difficulty urinating, headache or slowing of the baby’s heart. Very rare risks include infection, bleeding, seizure, irregular heart rhythms and nerve injury.  Regional Anesthesia (“spinal”, “epidural”, & “nerve blocks”):  I understand that rare but potential complications include headache, bleeding, infection, seizure, irregular heart rhythms, and nerve injury.    _____________________________________________________________________________  Patient (or Representative) Signature/Relationship to Patient  Date   Time    _____________________________________________________________________________   Name (if used)    Language/Organization   Time    _____________________________________________________________________________  Nurse Anesthetist Signature     Date   Time  _____________________________________________________________________________  Anesthesiologist Signature     Date   Time  I have discussed the procedure and information above with the patient (or patient’s representative) and answered their questions. The patient or their representative has agreed to have anesthesia services.    _____________________________________________________________________________  Witness        Date   Time  I have verified that the signature is that of the patient or patient’s representative, and that it was signed before the procedure  Patient Name: Claudia Trotter     : 1955                 Printed: 2024 at 12:52 PM    Medical Record #: [id number]                                            Page 1 of 1  ----------ANESTHESIA CONSENT----------